# Patient Record
Sex: MALE | Employment: PART TIME | URBAN - METROPOLITAN AREA
[De-identification: names, ages, dates, MRNs, and addresses within clinical notes are randomized per-mention and may not be internally consistent; named-entity substitution may affect disease eponyms.]

---

## 2017-10-06 ENCOUNTER — OFFICE VISIT (OUTPATIENT)
Dept: FAMILY MEDICINE | Facility: CLINIC | Age: 27
End: 2017-10-06
Payer: COMMERCIAL

## 2017-10-06 VITALS
BODY MASS INDEX: 27.43 KG/M2 | HEART RATE: 74 BPM | TEMPERATURE: 97.7 F | DIASTOLIC BLOOD PRESSURE: 87 MMHG | SYSTOLIC BLOOD PRESSURE: 135 MMHG | OXYGEN SATURATION: 97 % | HEIGHT: 73 IN | WEIGHT: 207 LBS

## 2017-10-06 DIAGNOSIS — R42 DIZZINESS: Primary | ICD-10-CM

## 2017-10-06 DIAGNOSIS — R12 HEARTBURN: ICD-10-CM

## 2017-10-06 DIAGNOSIS — R11.2 NON-INTRACTABLE VOMITING WITH NAUSEA, UNSPECIFIED VOMITING TYPE: ICD-10-CM

## 2017-10-06 LAB
ALBUMIN UR-MCNC: NEGATIVE MG/DL
AMORPH CRY #/AREA URNS HPF: ABNORMAL /HPF
APPEARANCE UR: CLEAR
BACTERIA #/AREA URNS HPF: ABNORMAL /HPF
BASOPHILS # BLD AUTO: 0 10E9/L (ref 0–0.2)
BASOPHILS NFR BLD AUTO: 0.2 %
BILIRUB UR QL STRIP: NEGATIVE
COLOR UR AUTO: YELLOW
DIFFERENTIAL METHOD BLD: ABNORMAL
EOSINOPHIL # BLD AUTO: 0.3 10E9/L (ref 0–0.7)
EOSINOPHIL NFR BLD AUTO: 3.6 %
ERYTHROCYTE [DISTWIDTH] IN BLOOD BY AUTOMATED COUNT: 13.7 % (ref 10–15)
GLUCOSE UR STRIP-MCNC: NEGATIVE MG/DL
HCT VFR BLD AUTO: 52.3 % (ref 40–53)
HGB BLD-MCNC: 18 G/DL (ref 13.3–17.7)
HGB UR QL STRIP: ABNORMAL
KETONES UR STRIP-MCNC: NEGATIVE MG/DL
LEUKOCYTE ESTERASE UR QL STRIP: NEGATIVE
LIPASE SERPL-CCNC: 217 U/L (ref 73–393)
LYMPHOCYTES # BLD AUTO: 3.3 10E9/L (ref 0.8–5.3)
LYMPHOCYTES NFR BLD AUTO: 35.9 %
MCH RBC QN AUTO: 31.8 PG (ref 26.5–33)
MCHC RBC AUTO-ENTMCNC: 34.4 G/DL (ref 31.5–36.5)
MCV RBC AUTO: 92 FL (ref 78–100)
MONOCYTES # BLD AUTO: 0.5 10E9/L (ref 0–1.3)
MONOCYTES NFR BLD AUTO: 5.8 %
NEUTROPHILS # BLD AUTO: 5 10E9/L (ref 1.6–8.3)
NEUTROPHILS NFR BLD AUTO: 54.5 %
NITRATE UR QL: NEGATIVE
PH UR STRIP: 7 PH (ref 5–7)
PLATELET # BLD AUTO: 237 10E9/L (ref 150–450)
RBC # BLD AUTO: 5.66 10E12/L (ref 4.4–5.9)
RBC #/AREA URNS AUTO: ABNORMAL /HPF
SOURCE: ABNORMAL
SP GR UR STRIP: 1.02 (ref 1–1.03)
UROBILINOGEN UR STRIP-ACNC: 0.2 EU/DL (ref 0.2–1)
WBC # BLD AUTO: 9.2 10E9/L (ref 4–11)
WBC #/AREA URNS AUTO: ABNORMAL /HPF

## 2017-10-06 PROCEDURE — 99214 OFFICE O/P EST MOD 30 MIN: CPT | Performed by: NURSE PRACTITIONER

## 2017-10-06 PROCEDURE — 85025 COMPLETE CBC W/AUTO DIFF WBC: CPT | Performed by: NURSE PRACTITIONER

## 2017-10-06 PROCEDURE — 36415 COLL VENOUS BLD VENIPUNCTURE: CPT | Performed by: NURSE PRACTITIONER

## 2017-10-06 PROCEDURE — 93000 ELECTROCARDIOGRAM COMPLETE: CPT | Performed by: NURSE PRACTITIONER

## 2017-10-06 PROCEDURE — 80053 COMPREHEN METABOLIC PANEL: CPT | Performed by: NURSE PRACTITIONER

## 2017-10-06 PROCEDURE — 83690 ASSAY OF LIPASE: CPT | Performed by: NURSE PRACTITIONER

## 2017-10-06 PROCEDURE — 81001 URINALYSIS AUTO W/SCOPE: CPT | Performed by: NURSE PRACTITIONER

## 2017-10-06 RX ORDER — ONDANSETRON 8 MG/1
8 TABLET, FILM COATED ORAL EVERY 8 HOURS PRN
Qty: 9 TABLET | Refills: 0 | Status: SHIPPED | OUTPATIENT
Start: 2017-10-06

## 2017-10-06 NOTE — PROGRESS NOTES
SUBJECTIVE:   Ildefonso Mccracken is a 27 year old male who presents to clinic today for the following health issues:    Headaches, dizziness, heartburn, vomiting, constipation, left hand numbness      Duration: 6 days    Description (location/character/radiation): sx began after a night of drinking. States he was very intoxicated, but didn't black out. Had 4 beers, 2 shots and one mixed drink on Saturday night. This was the first time he had alcohol in 6 months. Is now experiencing extreme heartburn, constipation, nausea and vomiting, headaches and dizziness. Also has left thumb and hand tingling since then. Dizziness is causing trouble with work.    Intensity:  moderate, severe, 8/10    Accompanying signs and symptoms: has also stopped eating meat and has become a vegetarian.    History (similar episodes/previous evaluation): None    Precipitating or alleviating factors: Alcohol Use    Therapies tried and outcome: Metamucil, Tums, OTC Stool softener - somewhat effective but still having multiple severe sx. Is missing work today due to dizziness.        Problem list and histories reviewed & adjusted, as indicated.  Additional history: as documented    Patient Active Problem List   Diagnosis     CARDIOVASCULAR SCREENING; LDL GOAL LESS THAN 160     Past Surgical History:   Procedure Laterality Date     arm surgery  high school     ORTHOPEDIC SURGERY         Social History   Substance Use Topics     Smoking status: Current Every Day Smoker     Smokeless tobacco: Not on file     Alcohol use Yes      Comment: occ     Family History   Problem Relation Age of Onset     Hypertension Father          Current Outpatient Prescriptions   Medication Sig Dispense Refill     Psyllium (METAMUCIL FIBER PO)        Docusate Calcium (STOOL SOFTENER PO)        Calcium Carbonate Antacid (TUMS E-X 750 PO)        ondansetron (ZOFRAN) 8 MG tablet Take 1 tablet (8 mg) by mouth every 8 hours as needed for nausea 9 tablet 0     ranitidine (ZANTAC)  "150 MG tablet Take 1 tablet (150 mg) by mouth 2 times daily 60 tablet 0     No Known Allergies      Reviewed and updated as needed this visit by clinical staff     Reviewed and updated as needed this visit by Provider         ROS:  Constitutional, HEENT, cardiovascular, pulmonary, gi and gu systems are negative, except as otherwise noted.      OBJECTIVE:   /87 (BP Location: Right arm, Patient Position: Standing, Cuff Size: Adult Large)  Pulse 74  Temp 97.7  F (36.5  C) (Tympanic)  Ht 6' 1\" (1.854 m)  Wt 207 lb (93.9 kg)  SpO2 97%  BMI 27.31 kg/m2  Body mass index is 27.31 kg/(m^2).   Orthostatics /81 P 72lying, sitting /89  P70  /87  P74 standing  GENERAL: healthy, alert and no distress  EYES: Eyes grossly normal to inspection, PERRL and conjunctivae and sclerae normal, EOMs intact  HENT: ear canals and TM's normal, nose and mouth without ulcers or lesions  NECK: no adenopathy, no asymmetry, masses, or scars and thyroid normal to palpation  RESP: lungs clear to auscultation - no rales, rhonchi or wheezes  CV: regular rate and rhythm, normal S1 S2, no S3 or S4, no murmur, click or rub, no peripheral edema and peripheral pulses strong  ABDOMEN: soft, nontender, no hepatosplenomegaly, no masses and bowel sounds normal  MS: no gross musculoskeletal defects noted, no edema  SKIN: no suspicious lesions or rashes  NEURO: Normal strength and tone, mentation intact and speech normal, good coordination ,   PSYCH: mentation appears normal, affect normal/bright    Diagnostic Test Results: pending    ASSESSMENT/PLAN:       ICD-10-CM    1. Dizziness R42 EKG 12-lead complete w/read - Clinics     Urine Microscopic   2. Heartburn R12 Lipase     ranitidine (ZANTAC) 150 MG tablet   3. Non-intractable vomiting with nausea, unspecified vomiting type R11.2 CBC with platelets differential     Comprehensive metabolic panel (BMP + Alb, Alk Phos, ALT, AST, Total. Bili, TP)     ondansetron (ZOFRAN) 8 MG tablet    "  *UA reflex to Microscopic and Culture ([place] [facility] (except [place] and [place])   possible viral infection on top of night of revelry.       Patient Instructions   Begin zantac 150mg twice a day  And take the zofran for nausea  Eat a bland diet and keep up with fluids  If vomiting continues or other symptoms develop please go to UC or ED over the weekend         SUNITHA Kumar [facility]

## 2017-10-06 NOTE — NURSING NOTE
"Chief Complaint   Patient presents with     New Patient     Dizziness, Migraines, Stomach pain       Initial /87 (BP Location: Right arm, Cuff Size: Adult Large)  Pulse 88  Temp 97.7  F (36.5  C) (Tympanic)  Ht 6' 1\" (1.854 m)  Wt 207 lb (93.9 kg)  SpO2 97%  BMI 27.31 kg/m2 Estimated body mass index is 27.31 kg/(m^2) as calculated from the following:    Height as of this encounter: 6' 1\" (1.854 m).    Weight as of this encounter: 207 lb (93.9 kg).  Medication Reconciliation: complete  Annia Layton MA      "

## 2017-10-06 NOTE — PATIENT INSTRUCTIONS
Begin zantac 150mg twice a day  And take the zofran for nausea  Eat a bland diet and keep up with fluids  If vomiting continues or other symptoms develop please go to UC or ED over the weekend

## 2017-10-06 NOTE — LETTER
[facility]  [address] [facility]  [address]  [place]  [address]  Tel: [phone]    October 9, 2017    Ildefonso Mccracken  [address] [place] [address]        Dear Mr. Mccracken,    Your labs are essentially normal, Mr Mccracken.  This includes liver, kidney and pancreas function.   The hemoglobin is slightly high but that I'm sure is a result of being dehydrated from the vomiting.   Hopefully you are doing much better.  If not please call the office to speak with me.    If you have any further questions or problems, please contact our office.      Sincerely,    Alecia Ellington, NARCISO/ Bárbara NORIEGA, CMA  Results for orders placed or performed in visit on 10/06/17   CBC with platelets differential   Result Value Ref Range    WBC 9.2 4.0 - 11.0 10e9/L    RBC Count 5.66 4.4 - 5.9 10e12/L    Hemoglobin 18.0 (H) 13.3 - 17.7 g/dL    Hematocrit 52.3 40.0 - 53.0 %    MCV 92 78 - 100 fl    MCH 31.8 26.5 - 33.0 pg    MCHC 34.4 31.5 - 36.5 g/dL    RDW 13.7 10.0 - 15.0 %    Platelet Count 237 150 - 450 10e9/L    Diff Method Automated Method     % Neutrophils 54.5 %    % Lymphocytes 35.9 %    % Monocytes 5.8 %    % Eosinophils 3.6 %    % Basophils 0.2 %    Absolute Neutrophil 5.0 1.6 - 8.3 10e9/L    Absolute Lymphocytes 3.3 0.8 - 5.3 10e9/L    Absolute Monocytes 0.5 0.0 - 1.3 10e9/L    Absolute Eosinophils 0.3 0.0 - 0.7 10e9/L    Absolute Basophils 0.0 0.0 - 0.2 10e9/L   Comprehensive metabolic panel (BMP + Alb, Alk Phos, ALT, AST, Total. Bili, TP)   Result Value Ref Range    Sodium 138 133 - 144 mmol/L    Potassium 4.3 3.4 - 5.3 mmol/L    Chloride 101 94 - 109 mmol/L    Carbon Dioxide 29 20 - 32 mmol/L    Anion Gap 8 3 - 14 mmol/L    Glucose 89 70 - 99 mg/dL    Urea Nitrogen 13 7 - 30 mg/dL    Creatinine 0.81 0.66 - 1.25 mg/dL    GFR Estimate >90 >60 mL/min/1.7m2    GFR Estimate If Black >90 >60 mL/min/1.7m2    Calcium 10.0 8.5 - 10.1 mg/dL    Bilirubin Total 0.4 0.2 - 1.3 mg/dL    Albumin 4.6 3.4 - 5.0 g/dL    Protein Total  8.1 6.8 - 8.8 g/dL    Alkaline Phosphatase 57 40 - 150 U/L    ALT 68 0 - 70 U/L    AST 34 0 - 45 U/L   *UA reflex to Microscopic and Culture ([place] [facility] (except [place] and [place])   Result Value Ref Range    Color Urine Yellow     Appearance Urine Clear     Glucose Urine Negative NEG^Negative mg/dL    Bilirubin Urine Negative NEG^Negative    Ketones Urine Negative NEG^Negative mg/dL    Specific Gravity Urine 1.020 1.003 - 1.035    Blood Urine Trace (A) NEG^Negative    pH Urine 7.0 5.0 - 7.0 pH    Protein Albumin Urine Negative NEG^Negative mg/dL    Urobilinogen Urine 0.2 0.2 - 1.0 EU/dL    Nitrite Urine Negative NEG^Negative    Leukocyte Esterase Urine Negative NEG^Negative    Source Midstream Urine    Lipase   Result Value Ref Range    Lipase 217 73 - 393 U/L   Urine Microscopic   Result Value Ref Range    WBC Urine O - 2 OTO2^O - 2 /HPF    RBC Urine O - 2 OTO2^O - 2 /HPF    Bacteria Urine Many (A) NEG^Negative /HPF    Amorphous Crystals Moderate (A) NEG^Negative /HPF               Enclosure: Lab Results

## 2017-10-06 NOTE — MR AVS SNAPSHOT
"              After Visit Summary   10/6/2017    Ildefonso Mccracken    MRN: [id number]           Patient Information     Date Of Birth          1990        Visit Information        Provider Department      10/6/2017 2:30 PM Alecia Ellington APRN CNP [facility]        Today's Diagnoses     Dizziness    -  1    Heartburn        Non-intractable vomiting with nausea, unspecified vomiting type          Care Instructions    Begin zantac 150mg twice a day  And take the zofran for nausea  Eat a bland diet and keep up with fluids  If vomiting continues or other symptoms develop please go to UC or ED over the weekend             Follow-ups after your visit        Who to contact     If you have questions or need follow up information about today's clinic visit or your schedule please contact [facility] directly at [phone].  Normal or non-critical lab and imaging results will be communicated to you by MyChart, letter or phone within 4 business days after the clinic has received the results. If you do not hear from us within 7 days, please contact the clinic through MyChart or phone. If you have a critical or abnormal lab result, we will notify you by phone as soon as possible.  Submit refill requests through [employer] or call your pharmacy and they will forward the refill request to us. Please allow 3 business days for your refill to be completed.          Additional Information About Your Visit        MyChart Information     [employer] lets you send messages to your doctor, view your test results, renew your prescriptions, schedule appointments and more. To sign up, go to [email or web address] . Click on \"Log in\" on the left side of the screen, which will take you to the Welcome page. Then click on \"Sign up Now\" on the right side of the page.     You will be asked to enter the access code listed below, as well as some personal information. Please follow the directions to create your username and " "password.     Your access code is: [email or web address]  Expires: 2018  4:10 PM     Your access code will  in 90 days. If you need help or a new code, please call your [facility] clinic or [phone].        Care EveryWhere ID     This is your Care EveryWhere ID. This could be used by other organizations to access your [facility] medical records  [id number]        Your Vitals Were     Pulse Temperature Height Pulse Oximetry BMI (Body Mass Index)       74 97.7  F (36.5  C) (Tympanic) 6' 1\" (1.854 m) 97% 27.31 kg/m2        Blood Pressure from Last 3 Encounters:   10/06/17 135/87   08/26/15 123/80   14 124/82    Weight from Last 3 Encounters:   10/06/17 207 lb (93.9 kg)   09/01/15 195 lb (88.5 kg)   08/26/15 195 lb (88.5 kg)              We Performed the Following     *UA reflex to Microscopic and Culture ([place] [facility] (except [place] and [place])     CBC with platelets differential     Comprehensive metabolic panel (BMP + Alb, Alk Phos, ALT, AST, Total. Bili, TP)     EKG 12-lead complete w/read - Clinics     Lipase     Urine Microscopic          Today's Medication Changes          These changes are accurate as of: 10/6/17  4:10 PM.  If you have any questions, ask your nurse or doctor.               Start taking these medicines.        Dose/Directions    ondansetron 8 MG tablet   Commonly known as:  ZOFRAN   Used for:  Non-intractable vomiting with nausea, unspecified vomiting type   Started by:  Alecia Ellington APRN CNP        Dose:  8 mg   Take 1 tablet (8 mg) by mouth every 8 hours as needed for nausea   Quantity:  9 tablet   Refills:  0       ranitidine 150 MG tablet   Commonly known as:  ZANTAC   Used for:  Heartburn   Started by:  Alecia Ellington APRN CNP        Dose:  150 mg   Take 1 tablet (150 mg) by mouth 2 times daily   Quantity:  60 tablet   Refills:  0            Where to get your medicines      These medications were sent to [facility] - " [address], [address]  [address], [address], [place] [address]     Phone:  [phone]     ondansetron 8 MG tablet    ranitidine 150 MG tablet                Primary Care Provider Office Phone # Fax #    Lupillo Barrera [phone]       [address]   [place] [address]        Equal Access to Services     [facility]: Hadii aad ku hadasho Soomaali, waaxda luqadaha, qaybta kaalmada adeegyada, waxay idiin hayaan adeeg khbrandysh la'aan . So [facility] [phone].    ATENCIÓN: Si habla español, tiene a vasquez disposición servicios gratuitos de asistencia lingüística. Llame al [phone].    We comply with applicable federal civil rights laws and [place] laws. We do not discriminate on the basis of race, color, national origin, age, disability, sex, sexual orientation, or gender identity.            Thank you!     Thank you for choosing [facility]  for your care. Our goal is always to provide you with excellent care. Hearing back from our patients is one way we can continue to improve our services. Please take a few minutes to complete the written survey that you may receive in the mail after your visit with us. Thank you!             Your Updated Medication List - Protect others around you: Learn how to safely use, store and throw away your medicines at www.disposemymeds.org.          This list is accurate as of: 10/6/17  4:10 PM.  Always use your most recent med list.                   Brand Name Dispense Instructions for use Diagnosis    METAMUCIL FIBER PO           ondansetron 8 MG tablet    ZOFRAN    9 tablet    Take 1 tablet (8 mg) by mouth every 8 hours as needed for nausea    Non-intractable vomiting with nausea, unspecified vomiting type       ranitidine 150 MG tablet    ZANTAC    60 tablet    Take 1 tablet (150 mg) by mouth 2 times daily    Heartburn       STOOL SOFTENER PO           TUMS E-X 750 PO

## 2017-10-07 LAB
ALBUMIN SERPL-MCNC: 4.6 G/DL (ref 3.4–5)
ALP SERPL-CCNC: 57 U/L (ref 40–150)
ALT SERPL W P-5'-P-CCNC: 68 U/L (ref 0–70)
ANION GAP SERPL CALCULATED.3IONS-SCNC: 8 MMOL/L (ref 3–14)
AST SERPL W P-5'-P-CCNC: 34 U/L (ref 0–45)
BILIRUB SERPL-MCNC: 0.4 MG/DL (ref 0.2–1.3)
BUN SERPL-MCNC: 13 MG/DL (ref 7–30)
CALCIUM SERPL-MCNC: 10 MG/DL (ref 8.5–10.1)
CHLORIDE SERPL-SCNC: 101 MMOL/L (ref 94–109)
CO2 SERPL-SCNC: 29 MMOL/L (ref 20–32)
CREAT SERPL-MCNC: 0.81 MG/DL (ref 0.66–1.25)
GFR SERPL CREATININE-BSD FRML MDRD: >90 ML/MIN/1.7M2
GLUCOSE SERPL-MCNC: 89 MG/DL (ref 70–99)
POTASSIUM SERPL-SCNC: 4.3 MMOL/L (ref 3.4–5.3)
PROT SERPL-MCNC: 8.1 G/DL (ref 6.8–8.8)
SODIUM SERPL-SCNC: 138 MMOL/L (ref 133–144)

## 2017-10-09 NOTE — PROGRESS NOTES
Your labs are essentially normal, Mr Mccracken.  This includes liver, kidney and pancreas function.   The hemoglobin is slightly high but that I'm sure is a result of being dehydrated from the vomiting.   Hopefully you are doing much better.  If not please call the office to speak with me.